# Patient Record
Sex: FEMALE | Race: BLACK OR AFRICAN AMERICAN | NOT HISPANIC OR LATINO | Employment: PART TIME | URBAN - METROPOLITAN AREA
[De-identification: names, ages, dates, MRNs, and addresses within clinical notes are randomized per-mention and may not be internally consistent; named-entity substitution may affect disease eponyms.]

---

## 2017-04-07 ENCOUNTER — HOSPITAL ENCOUNTER (EMERGENCY)
Facility: HOSPITAL | Age: 22
Discharge: HOME | End: 2017-04-07
Attending: EMERGENCY MEDICINE
Payer: COMMERCIAL

## 2017-04-07 VITALS
HEIGHT: 61 IN | BODY MASS INDEX: 26.43 KG/M2 | DIASTOLIC BLOOD PRESSURE: 58 MMHG | SYSTOLIC BLOOD PRESSURE: 128 MMHG | HEART RATE: 52 BPM | RESPIRATION RATE: 18 BRPM | TEMPERATURE: 98 F | WEIGHT: 140 LBS | OXYGEN SATURATION: 98 %

## 2017-04-07 DIAGNOSIS — E86.0 DEHYDRATION: Primary | ICD-10-CM

## 2017-04-07 LAB
ALBUMIN SERPL BCP-MCNC: 4 G/DL
ALP SERPL-CCNC: 57 U/L
ALT SERPL W/O P-5'-P-CCNC: 8 U/L
ANION GAP SERPL CALC-SCNC: 6 MMOL/L
AST SERPL-CCNC: 16 U/L
B-HCG UR QL: NEGATIVE
BASOPHILS # BLD AUTO: 0.04 K/UL
BASOPHILS NFR BLD: 0.5 %
BILIRUB SERPL-MCNC: 0.2 MG/DL
BILIRUB UR QL STRIP: NEGATIVE
BUN SERPL-MCNC: 15 MG/DL
CALCIUM SERPL-MCNC: 8.9 MG/DL
CHLORIDE SERPL-SCNC: 107 MMOL/L
CLARITY UR: CLEAR
CO2 SERPL-SCNC: 25 MMOL/L
COLOR UR: NORMAL
CREAT SERPL-MCNC: 0.8 MG/DL
CTP QC/QA: YES
DIFFERENTIAL METHOD: ABNORMAL
EOSINOPHIL # BLD AUTO: 0.1 K/UL
EOSINOPHIL NFR BLD: 1.6 %
ERYTHROCYTE [DISTWIDTH] IN BLOOD BY AUTOMATED COUNT: 14.4 %
EST. GFR  (AFRICAN AMERICAN): >60 ML/MIN/1.73 M^2
EST. GFR  (NON AFRICAN AMERICAN): >60 ML/MIN/1.73 M^2
GLUCOSE SERPL-MCNC: 90 MG/DL
GLUCOSE UR QL STRIP: NEGATIVE
HCT VFR BLD AUTO: 36.3 %
HGB BLD-MCNC: 11.8 G/DL
HGB UR QL STRIP: NEGATIVE
KETONES UR QL STRIP: NEGATIVE
LEUKOCYTE ESTERASE UR QL STRIP: NEGATIVE
LYMPHOCYTES # BLD AUTO: 2.6 K/UL
LYMPHOCYTES NFR BLD: 29.5 %
MCH RBC QN AUTO: 27.1 PG
MCHC RBC AUTO-ENTMCNC: 32.5 %
MCV RBC AUTO: 83 FL
MONOCYTES # BLD AUTO: 0.6 K/UL
MONOCYTES NFR BLD: 7.2 %
NEUTROPHILS # BLD AUTO: 5.3 K/UL
NEUTROPHILS NFR BLD: 60.9 %
NITRITE UR QL STRIP: NEGATIVE
PH UR STRIP: 7 [PH] (ref 5–8)
PLATELET # BLD AUTO: 335 K/UL
PMV BLD AUTO: 9.2 FL
POTASSIUM SERPL-SCNC: 4.5 MMOL/L
PROT SERPL-MCNC: 7.4 G/DL
PROT UR QL STRIP: NEGATIVE
RBC # BLD AUTO: 4.35 M/UL
SODIUM SERPL-SCNC: 138 MMOL/L
SP GR UR STRIP: 1.02 (ref 1–1.03)
URN SPEC COLLECT METH UR: NORMAL
UROBILINOGEN UR STRIP-ACNC: NEGATIVE EU/DL
WBC # BLD AUTO: 8.67 K/UL

## 2017-04-07 PROCEDURE — 93005 ELECTROCARDIOGRAM TRACING: CPT

## 2017-04-07 PROCEDURE — 96360 HYDRATION IV INFUSION INIT: CPT

## 2017-04-07 PROCEDURE — 99284 EMERGENCY DEPT VISIT MOD MDM: CPT | Mod: 25

## 2017-04-07 PROCEDURE — 85025 COMPLETE CBC W/AUTO DIFF WBC: CPT

## 2017-04-07 PROCEDURE — 81025 URINE PREGNANCY TEST: CPT | Performed by: EMERGENCY MEDICINE

## 2017-04-07 PROCEDURE — 81003 URINALYSIS AUTO W/O SCOPE: CPT

## 2017-04-07 PROCEDURE — 80053 COMPREHEN METABOLIC PANEL: CPT

## 2017-04-07 PROCEDURE — 25000003 PHARM REV CODE 250: Performed by: EMERGENCY MEDICINE

## 2017-04-07 RX ORDER — SODIUM CHLORIDE 9 MG/ML
1000 INJECTION, SOLUTION INTRAVENOUS
Status: COMPLETED | OUTPATIENT
Start: 2017-04-07 | End: 2017-04-07

## 2017-04-07 RX ADMIN — SODIUM CHLORIDE 1000 ML: 0.9 INJECTION, SOLUTION INTRAVENOUS at 10:04

## 2017-04-07 NOTE — DISCHARGE INSTRUCTIONS
Dehydration    The human body is comprised largely of water. If you lose more fluids than you take in, you can become dehydrated. This means there are not enough fluids in your body for it to function right. Mild dehydration can cause weakness, confusion, or muscle cramps. In extreme cases, it can lead to brain damage and even death. That's why prompt treatment is crucial.  Risk factors  Anyone can become dehydrated. But infants, children, and older adults are at greatest risk. You are most likely to lose fluids with severe vomiting, diarrhea, or a fever. Exercising or working hard--especially in hot weather--can also cause excess fluid loss.  What to do  Drinking liquids is the best way to prevent dehydration. Water is best, but juice or frozen pops can also help. Your doctor may suggest electrolyte solutions for sick infants and young children.  When to go to the emergency room (ER)  Go to an ER right away for these symptoms:  Adults  · Very dark urine and little urine output  · Dizziness, weakness, confusion, fainting  Children  · Sunken eyes  · Little or no urine output (for infants, no wet diaper in 8 hours)  · Very dark urine  · Skin that doesn't bounce back quickly when pinched  · Crying without tears  What to expect in the ER  Your blood pressure, temperature, and heart rate will be checked. You may have blood or urine tests. The main treatment for dehydration is fluids. You may be given these to drink. Or, you may receive them through a vein in your arm. You also may be treated for diarrhea, vomiting, or a high fever.   Date Last Reviewed: 7/18/2015  © 7926-3802 [employer]. [address] [place] [address], [place] [address]. All rights reserved. This information is not intended as a substitute for professional medical care. Always follow your healthcare professional's instructions.

## 2017-04-07 NOTE — ED TRIAGE NOTES
"Pt comes in after syncopal episode 45 minutes pta. Pt states she was doing her makeup in the bathroom and she lost consciousness and woke up on bathroom floor. Pt currently complains of right side headache and lower back pain rated 7/10. She states, "I think I hit the tub or something." Pt also complains of nausea and vomiting x 1 week. Pt states, "I don't know LMP. I took a plan B a few months ago." She also states she smoked marijuana last night.  "

## 2017-04-07 NOTE — ED PROVIDER NOTES
"Encounter Date: 4/7/2017    SCRIBE #1 NOTE: I, Patrick Cooley, am scribing for, and in the presence of,  Kwabena Corado MD. I have scribed the following portions of the note - Other sections scribed: HPI, ROS, PE and MDM.       History     Chief Complaint   Patient presents with    Loss of Consciousness     Pt. presents with reported syncopal episode that occured this AM. Pt. reports that she was "doing her make up and then doesn't remember anything after and woke up confused". Pt. c/o headache to the right temple.      Review of patient's allergies indicates:  No Known Allergies  HPI Comments: CC: Loss of Consciousness    HPI: This 21 y.o F with no pertinent PMHx presents to the ED for emergent evaluation of acute onset of moderate (7/10) LOC with associated light-headedness this AM after getting out of bed. The pt also reports nausea and emesis x1 week. The pt reports taking a Plan-B pill a few months ago. The pt also notes smoking "2 puffs" of marijuana last night. The pt denies abdominal pain, fever, chills, dysuria, and frequency. No prior tx.     The history is provided by the patient. No  was used.     History reviewed. No pertinent past medical history.  Past Surgical History:   Procedure Laterality Date    ADENOIDECTOMY      cyst on ovary      TONSILLECTOMY       History reviewed. No pertinent family history.  Social History   Substance Use Topics    Smoking status: Current Every Day Smoker     Packs/day: 0.50     Types: Cigarettes, Vaping w/o nicotine    Smokeless tobacco: None    Alcohol use Yes      Comment: every other week     Review of Systems   Constitutional: Negative for chills and fever.   HENT: Negative for sore throat.    Respiratory: Negative for shortness of breath.    Cardiovascular: Negative for chest pain.   Gastrointestinal: Positive for nausea and vomiting. Negative for abdominal pain.   Genitourinary: Negative for dysuria and frequency.   Musculoskeletal: Negative " for back pain.   Skin: Negative for rash.   Neurological: Positive for light-headedness. Negative for weakness.        (+) LOC   Hematological: Does not bruise/bleed easily.       Physical Exam   Initial Vitals   BP Pulse Resp Temp SpO2   04/07/17 0925 04/07/17 0925 04/07/17 0925 04/07/17 0925 04/07/17 0925   107/63 84 16 98.3 °F (36.8 °C) 98 %     Physical Exam    Nursing note and vitals reviewed.  Constitutional: She appears well-developed and well-nourished.  Non-toxic appearance. She does not appear ill.   HENT:   Head: Normocephalic and atraumatic.   Dry lips   Eyes: EOM are normal.   Neck: Neck supple.   Cardiovascular: Normal rate and regular rhythm.   Pulmonary/Chest: Effort normal and breath sounds normal. No respiratory distress.   Abdominal: Soft. Normal appearance and bowel sounds are normal. She exhibits no distension. There is no tenderness.   Musculoskeletal: Normal range of motion.   Neurological: She is alert.   Skin: Skin is warm and dry.   Psychiatric: She has a normal mood and affect.         ED Course   Procedures  Labs Reviewed   CBC W/ AUTO DIFFERENTIAL - Abnormal; Notable for the following:        Result Value    Hemoglobin 11.8 (*)     Hematocrit 36.3 (*)     All other components within normal limits   COMPREHENSIVE METABOLIC PANEL - Abnormal; Notable for the following:     ALT 8 (*)     Anion Gap 6 (*)     All other components within normal limits   URINALYSIS   POCT URINE PREGNANCY     EKG Readings: (Independently Interpreted)   Initial Reading: No STEMI. Rhythm: Sinus Arrhythmia. Heart Rate: 61. ST Segments: Normal ST Segments. T Waves: Normal.          Medical Decision Making:   History:   Old Medical Records: I decided to obtain old medical records.  Clinical Tests:   Lab Tests: Ordered and Reviewed  ED Management:  This is a 21-year-old female complaining of syncope.  The patient woke up one treated to the bathroom is began applying makeup.  She shortly became dizzy and passed out.   Her differential includes pregnancy, infection, dehydration, anemia.  I have also considered a cardiac arrhythmia.  She denies any recent vomiting or diarrheal illnesses.  She has been eating normally.      On exam, the patient is nontoxic and non-ill-appearing.  She has dry mucous membranes.  She had an EKG which was consistent with a sinus arrhythmia.  There is no preexcitation or significant arrhythmia.  She is not anemic.  She does not have uremia.  She is not pregnant and does not have a UTI.    She was treated with IV fluids during her stay.  She did not have recurrence of her syncope.  The patient's symptoms are most likely caused by dehydration and orthostatic hypotension.  She was advised to push fluids.  She was discharged in stable condition.            Scribe Attestation:   Scribe #1: I performed the above scribed service and the documentation accurately describes the services I performed. I attest to the accuracy of the note.    Attending Attestation:           Physician Attestation for Scribe:  Physician Attestation Statement for Scribe #1: I, Kwabena Corado MD, reviewed documentation, as scribed by Patrick Cooley in my presence, and it is both accurate and complete.                 ED Course     Clinical Impression:           1. Dehydration         Kwabena Corado MD  04/07/17 1386

## 2017-04-07 NOTE — ED AVS SNAPSHOT
[facility]  [place] [address]               Pearl Hernandez   2017  9:28 AM   ED    Description:  Female : 1995   Department:  [facility]           Your Care was Coordinated By:     Provider Role From To    Kwabena Corado MD Attending Provider 17 0932 --      Reason for Visit     Loss of Consciousness           Diagnoses this Visit        Comments    Dehydration    -  Primary       ED Disposition     ED Disposition Condition Comment    Discharge  Our goal in the emergency department is to always give you outstanding care and exceptional service. You may receive a survey by mail or e-mail in the next week regarding your experience in our ED. We would greatly appreciate your completing and returnin g the survey. Your feedback provides us with a way to recognize our staff who give very good care and it helps us learn how to improve when your experience was below our aspiration of excellence.              To Do List           Follow-up Information     Follow up with Primary Doctor No.    Why:  As needed        Follow up with [facility].    Specialty:  Emergency Medicine    Why:  If symptoms worsen    Contact information:    [place] [id number] [phone]      [facility]     [facility] Care Line -  Assistance  Unless otherwise directed by your provider, please contact [facility], our nurse care line that is available for  assistance.     Registered nurses in the [facility] provide: appointment scheduling, clinical advisement, health education, and other advisory services.  Call: [phone] (toll free)               Medications           Message regarding Medications     Verify the changes and/or additions to your medication regime listed below are the same as discussed with your clinician today.  If any of these changes or additions  "are incorrect, please notify your healthcare provider.        These medications were administered today        Dose Freq    0.9%  NaCl infusion 1,000 mL ED 1 Time    Sig: Inject 1,000 mLs into the vein ED 1 Time.    Class: Normal    Route: Intravenous           Verify that the below list of medications is an accurate representation of the medications you are currently taking.  If none reported, the list may be blank. If incorrect, please contact your healthcare provider. Carry this list with you in case of emergency.           Current Medications            Clinical Reference Information           Your Vitals Were     BP Pulse Temp Resp Height Weight    128/58 52 98.3 °F (36.8 °C) (Oral) 18 5' 1" (1.549 m) 63.5 kg (140 lb)    Last Period SpO2 BMI          (LMP Unknown) 98% 26.45 kg/m2        Allergies as of 4/7/2017     No Known Allergies      Immunizations Administered on Date of Encounter - 4/7/2017     None      ED Micro, Lab, POCT     Start Ordered       Status Ordering Provider    04/07/17 0948 04/07/17 0948  Urinalysis - Clean Catch  STAT      Final result     04/07/17 0948 04/07/17 0948  POCT urine pregnancy  Once      Final result     04/07/17 0942 04/07/17 0948  CBC auto differential  STAT      Final result     04/07/17 0942 04/07/17 0948  Comprehensive metabolic panel  STAT      Final result       ED Imaging Orders     None        Discharge Instructions         Dehydration    The human body is comprised largely of water. If you lose more fluids than you take in, you can become dehydrated. This means there are not enough fluids in your body for it to function right. Mild dehydration can cause weakness, confusion, or muscle cramps. In extreme cases, it can lead to brain damage and even death. That's why prompt treatment is crucial.  Risk factors  Anyone can become dehydrated. But infants, children, and older adults are at greatest risk. You are most likely to lose fluids with severe vomiting, diarrhea, or a " fever. Exercising or working hard--especially in hot weather--can also cause excess fluid loss.  What to do  Drinking liquids is the best way to prevent dehydration. Water is best, but juice or frozen pops can also help. Your doctor may suggest electrolyte solutions for sick infants and young children.  When to go to the emergency room (ER)  Go to an ER right away for these symptoms:  Adults  · Very dark urine and little urine output  · Dizziness, weakness, confusion, fainting  Children  · Sunken eyes  · Little or no urine output (for infants, no wet diaper in 8 hours)  · Very dark urine  · Skin that doesn't bounce back quickly when pinched  · Crying without tears  What to expect in the ER  Your blood pressure, temperature, and heart rate will be checked. You may have blood or urine tests. The main treatment for dehydration is fluids. You may be given these to drink. Or, you may receive them through a vein in your arm. You also may be treated for diarrhea, vomiting, or a high fever.   Date Last Reviewed: 7/18/2015 © 2000-2016 [employer]. [address] [place] [address]. All rights reserved. This information is not intended as a substitute for professional medical care. Always follow your healthcare professional's instructions.          Smoking Cessation     If you would like to quit smoking:   You may be eligible for free services if you are a [place] resident and started smoking cigarettes before September 1, 1988.  Call the Smoking Cessation [employer] ([employer]) toll free at (174) 774-2845 or (316) 713-1932.   Call 1-800-QUIT-NOW if you do not meet the above criteria.   Contact us via email: [email or web address]   View our website for more information: [email or web address]         [facility] complies with applicable Federal civil rights laws and does not discriminate on the basis of race, color, national origin, age, disability, or sex.        Language Assistance  Services     ATTENTION: Language assistance services are available, free of charge. Please call [phone].      ATENCIÓN: Si habla español, tiene a pelayo disposición servicios gratuitos de asistencia lingüística. Llame al [phone].     CHÚ Ý: N?u b?n nói Ti?ng Vi?t, có các d?ch v? h? tr? ngôn ng? mi?n phí dành cho b?n. G?i s? [phone].